# Patient Record
Sex: FEMALE | Race: WHITE | NOT HISPANIC OR LATINO | Employment: FULL TIME | URBAN - METROPOLITAN AREA
[De-identification: names, ages, dates, MRNs, and addresses within clinical notes are randomized per-mention and may not be internally consistent; named-entity substitution may affect disease eponyms.]

---
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
## 2024-01-04 PROCEDURE — 87081 CULTURE SCREEN ONLY: CPT | Performed by: FAMILY MEDICINE

## 2024-01-06 ENCOUNTER — TELEPHONE (OUTPATIENT)
Dept: URGENT CARE | Facility: CLINIC | Age: 19
End: 2024-01-06
Payer: COMMERCIAL

## 2024-02-15 ENCOUNTER — OFFICE VISIT (OUTPATIENT)
Dept: OBSTETRICS AND GYNECOLOGY | Facility: CLINIC | Age: 19
End: 2024-02-15
Payer: COMMERCIAL

## 2024-02-15 VITALS
HEART RATE: 91 BPM | WEIGHT: 234 LBS | DIASTOLIC BLOOD PRESSURE: 83 MMHG | SYSTOLIC BLOOD PRESSURE: 128 MMHG | BODY MASS INDEX: 40.17 KG/M2

## 2024-02-15 DIAGNOSIS — Z01.419 WELL WOMAN EXAM: Primary | ICD-10-CM

## 2024-02-15 DIAGNOSIS — Z11.3 SCREEN FOR STD (SEXUALLY TRANSMITTED DISEASE): ICD-10-CM

## 2024-02-15 LAB
B-HCG UR QL: NEGATIVE
C TRACH RRNA CVX QL NAA+PROBE: NOT DETECTED
EXPIRATION DATE: NORMAL
INTERNAL NEGATIVE CONTROL: NORMAL
INTERNAL POSITIVE CONTROL: NORMAL
Lab: NORMAL
N GONORRHOEA RRNA SPEC QL NAA+PROBE: NOT DETECTED

## 2024-02-15 PROCEDURE — G0123 SCREEN CERV/VAG THIN LAYER: HCPCS

## 2024-02-15 PROCEDURE — 87591 N.GONORRHOEAE DNA AMP PROB: CPT | Performed by: NURSE PRACTITIONER

## 2024-02-15 PROCEDURE — 87491 CHLMYD TRACH DNA AMP PROBE: CPT | Performed by: NURSE PRACTITIONER

## 2024-02-15 RX ORDER — VITAMIN A ACETATE, .BETA.-CAROTENE, ASCORBIC ACID, CHOLECALCIFEROL, .ALPHA.-TOCOPHEROL ACETATE, DL-, THIAMINE MONONITRATE, RIBOFLAVIN, NIACINAMIDE, PYRIDOXINE HYDROCHLORIDE, FOLIC ACID, CYANOCOBALAMIN, CALCIUM CARBONATE, FERROUS FUMARATE, ZINC OXIDE, AND CUPRIC OXIDE 2000; 2000; 120; 400; 22; 1.84; 3; 20; 10; 1; 12; 200; 27; 25; 2 [IU]/1; [IU]/1; MG/1; [IU]/1; MG/1; MG/1; MG/1; MG/1; MG/1; MG/1; UG/1; MG/1; MG/1; MG/1; MG/1
1 TABLET ORAL DAILY
Qty: 90 TABLET | Refills: 3 | Status: SHIPPED | OUTPATIENT
Start: 2024-02-15

## 2024-02-19 LAB
CONV .: NORMAL
CYTOLOGIST CVX/VAG CYTO: NORMAL
CYTOLOGY CVX/VAG DOC CYTO: NORMAL
CYTOLOGY CVX/VAG DOC THIN PREP: NORMAL
DX ICD CODE: NORMAL
HIV 1 & 2 AB SER-IMP: NORMAL
OTHER STN SPEC: NORMAL
STAT OF ADQ CVX/VAG CYTO-IMP: NORMAL

## 2024-08-23 ENCOUNTER — OFFICE VISIT (OUTPATIENT)
Dept: FAMILY MEDICINE CLINIC | Facility: CLINIC | Age: 19
End: 2024-08-23
Payer: COMMERCIAL

## 2024-08-23 VITALS
HEIGHT: 64 IN | OXYGEN SATURATION: 99 % | WEIGHT: 246.1 LBS | HEART RATE: 71 BPM | DIASTOLIC BLOOD PRESSURE: 82 MMHG | TEMPERATURE: 98.6 F | BODY MASS INDEX: 42.02 KG/M2 | SYSTOLIC BLOOD PRESSURE: 112 MMHG

## 2024-08-23 DIAGNOSIS — Z00.00 ANNUAL PHYSICAL EXAM: ICD-10-CM

## 2024-08-23 DIAGNOSIS — K64.9 HEMORRHOIDS, UNSPECIFIED HEMORRHOID TYPE: ICD-10-CM

## 2024-08-23 DIAGNOSIS — F41.1 GAD (GENERALIZED ANXIETY DISORDER): ICD-10-CM

## 2024-08-23 DIAGNOSIS — Z00.00 ENCOUNTER FOR MEDICAL EXAMINATION TO ESTABLISH CARE: Primary | ICD-10-CM

## 2024-08-23 LAB
ALBUMIN SERPL-MCNC: 4 G/DL (ref 3.5–5.2)
ALBUMIN/GLOB SERPL: 1.2 G/DL
ALP SERPL-CCNC: 73 U/L (ref 39–117)
ALT SERPL W P-5'-P-CCNC: 27 U/L (ref 1–33)
ANION GAP SERPL CALCULATED.3IONS-SCNC: 7.9 MMOL/L (ref 5–15)
AST SERPL-CCNC: 17 U/L (ref 1–32)
BASOPHILS # BLD AUTO: 0.04 10*3/MM3 (ref 0–0.2)
BASOPHILS NFR BLD AUTO: 0.5 % (ref 0–1.5)
BILIRUB SERPL-MCNC: 0.2 MG/DL (ref 0–1.2)
BUN SERPL-MCNC: 7 MG/DL (ref 6–20)
BUN/CREAT SERPL: 7 (ref 7–25)
CALCIUM SPEC-SCNC: 9.2 MG/DL (ref 8.6–10.5)
CHLORIDE SERPL-SCNC: 103 MMOL/L (ref 98–107)
CHOLEST SERPL-MCNC: 71 MG/DL (ref 0–200)
CO2 SERPL-SCNC: 26.1 MMOL/L (ref 22–29)
CREAT SERPL-MCNC: 1 MG/DL (ref 0.57–1)
DEPRECATED RDW RBC AUTO: 39.2 FL (ref 37–54)
EGFRCR SERPLBLD CKD-EPI 2021: 82.4 ML/MIN/1.73
EOSINOPHIL # BLD AUTO: 0.25 10*3/MM3 (ref 0–0.4)
EOSINOPHIL NFR BLD AUTO: 3.1 % (ref 0.3–6.2)
ERYTHROCYTE [DISTWIDTH] IN BLOOD BY AUTOMATED COUNT: 13.4 % (ref 12.3–15.4)
GLOBULIN UR ELPH-MCNC: 3.4 GM/DL
GLUCOSE SERPL-MCNC: 75 MG/DL (ref 65–99)
HCT VFR BLD AUTO: 36.7 % (ref 34–46.6)
HDLC SERPL-MCNC: 42 MG/DL (ref 40–60)
HGB BLD-MCNC: 12.2 G/DL (ref 12–15.9)
IMM GRANULOCYTES # BLD AUTO: 0.01 10*3/MM3 (ref 0–0.05)
IMM GRANULOCYTES NFR BLD AUTO: 0.1 % (ref 0–0.5)
LDLC SERPL CALC-MCNC: 16 MG/DL (ref 0–100)
LDLC/HDLC SERPL: 0.44 {RATIO}
LYMPHOCYTES # BLD AUTO: 3.05 10*3/MM3 (ref 0.7–3.1)
LYMPHOCYTES NFR BLD AUTO: 38.4 % (ref 19.6–45.3)
MCH RBC QN AUTO: 27.4 PG (ref 26.6–33)
MCHC RBC AUTO-ENTMCNC: 33.2 G/DL (ref 31.5–35.7)
MCV RBC AUTO: 82.3 FL (ref 79–97)
MONOCYTES # BLD AUTO: 0.64 10*3/MM3 (ref 0.1–0.9)
MONOCYTES NFR BLD AUTO: 8.1 % (ref 5–12)
NEUTROPHILS NFR BLD AUTO: 3.96 10*3/MM3 (ref 1.7–7)
NEUTROPHILS NFR BLD AUTO: 49.8 % (ref 42.7–76)
NRBC BLD AUTO-RTO: 0 /100 WBC (ref 0–0.2)
PLATELET # BLD AUTO: 329 10*3/MM3 (ref 140–450)
PMV BLD AUTO: 11.4 FL (ref 6–12)
POTASSIUM SERPL-SCNC: 4.1 MMOL/L (ref 3.5–5.2)
PROT SERPL-MCNC: 7.4 G/DL (ref 6–8.5)
RBC # BLD AUTO: 4.46 10*6/MM3 (ref 3.77–5.28)
SODIUM SERPL-SCNC: 137 MMOL/L (ref 136–145)
TRIGL SERPL-MCNC: 52 MG/DL (ref 0–150)
TSH SERPL DL<=0.05 MIU/L-ACNC: 1.44 UIU/ML (ref 0.27–4.2)
VLDLC SERPL-MCNC: 13 MG/DL (ref 5–40)
WBC NRBC COR # BLD AUTO: 7.95 10*3/MM3 (ref 3.4–10.8)

## 2024-08-23 PROCEDURE — 80050 GENERAL HEALTH PANEL: CPT

## 2024-08-23 PROCEDURE — 80061 LIPID PANEL: CPT

## 2024-08-23 RX ORDER — HYDROCORTISONE ACETATE 25 MG/1
25 SUPPOSITORY RECTAL 2 TIMES DAILY PRN
Qty: 12 EACH | Refills: 1 | Status: SHIPPED | OUTPATIENT
Start: 2024-08-23

## 2024-08-23 RX ORDER — ESCITALOPRAM OXALATE 10 MG/1
10 TABLET ORAL DAILY
Qty: 30 TABLET | Refills: 2 | Status: SHIPPED | OUTPATIENT
Start: 2024-08-23

## 2024-10-18 PROCEDURE — 87086 URINE CULTURE/COLONY COUNT: CPT

## 2024-10-18 PROCEDURE — 87077 CULTURE AEROBIC IDENTIFY: CPT

## 2024-10-18 PROCEDURE — 87186 SC STD MICRODIL/AGAR DIL: CPT

## 2024-11-15 ENCOUNTER — TELEPHONE (OUTPATIENT)
Dept: OBSTETRICS AND GYNECOLOGY | Facility: CLINIC | Age: 19
End: 2024-11-15
Payer: COMMERCIAL

## 2024-11-15 NOTE — TELEPHONE ENCOUNTER
11/15/2024 LVM FOR PATIENT TO RETURN CALL TO RESCHEDULE ANNUAL APPT ON  2/17/2025  WITH KIRSTIN BAZAN SINCE SHE'LL BE OUT OF THE OFFICE ON THIS DAY.   11/18/2024 PATIENT WAS CALLED AND SCHEDULED FOR 2/18/2025 @ 2:45 FOR ANNUAL WITH KIRSTIN BAZAN.  PATIENT TO CHECK HER PLANNER AND IF TIME DOESN'T WORK SHE'LL CALL BACK TO RESCHEDULE.

## 2025-02-18 ENCOUNTER — PATIENT MESSAGE (OUTPATIENT)
Dept: OBSTETRICS AND GYNECOLOGY | Facility: CLINIC | Age: 20
End: 2025-02-18
Payer: COMMERCIAL

## 2025-02-20 NOTE — PROGRESS NOTES
"Chief Complaint   Patient presents with    Annual Exam     HPI:   20 y.o. . Presents for well woman exam. Contraception:  Natural family planning  Menses:   q month, lasts 5 days, changes super tampon q 2 hrs on heaviest days.   Pain:  None  Last pap: normal IGP,rfx Aptima HPV All Pth (02/15/2024 15:51)    Desires pregnancy, not using contraception with partner for over 1 year. Hx chlamydia.     Past Medical History:   Diagnosis Date    Abnormal uterine bleeding (AUB)     Anxiety     Chlamydia     H/O cold sores     Hemorrhoids     Migraines     Obesity     PCOS (polycystic ovarian syndrome)       Past Surgical History:   Procedure Laterality Date    DENTAL PROCEDURE      WRIST SURGERY Left       Family History   Problem Relation Age of Onset    Anxiety disorder Mother     Diabetes Maternal Grandmother         My great grandma    Heart disease Other     Hyperlipidemia Other     Hypertension Other     Diabetes Other     Lung cancer Other     Uterine cancer Neg Hx     Prostate cancer Neg Hx      Allergies as of 2025    (No Known Allergies)        PCP: does manage PMHx and preventative labs    /84   Pulse 89   Ht 162.6 cm (64\")   Wt 114 kg (252 lb)   LMP 2025 (Exact Date)   BMI 43.26 kg/m²     PHYSICAL EXAM: Chaperone present   General- NAD, alert and oriented, appropriate  Psych- Normal mood, good memory  Neck- No masses, no thyroid enlargement  Lymphatic- No palpable neck, axillary, or groin nodes  CV- Regular rhythm, no murmurs  Resp- CTA to bases, no wheezes  Abdomen- Soft, non distended, non tender, no masses  Breast left-  Bilaterally symmetrical, no masses, non tender, no nipple discharge  Breast right- Bilaterally symmetrical, no masses, non tender, no nipple discharge  External genitalia- Normal female, no lesions  Urethra/meatus- Normal, no masses, non tender, no prolapse  Bladder- Normal, no masses, non tender, no prolapse  Vagina- Normal, no atrophy, no lesions, no discharge, " no prolapse  Cvx- Normal, no lesions, no discharge, No cervical motion tenderness  Uterus- Normal size, shape & consistency.  Non tender, mobile.  Adnexa- No mass, non tender  Anus/Rectum/Perineum- Not performed  Ext- No edema, no cyanosis    Skin- No lesions, no rashes, no acanthosis nigricans    ASSESSMENT and PLAN:    Diagnoses and all orders for this visit:    1. Well woman exam (Primary)    2. Screen for STD (sexually transmitted disease)  -     Chlamydia trachomatis, Neisseria gonorrhoeae, PCR - Swab, Cervix  -     RPR, Rfx Qn RPR / Confirm TP  -     Hepatitis B Surface Antigen  -     Hepatitis C Antibody  -     HIV-1 / O / 2 Ag / Antibody 4th Generation        Preventative:   BREAST HEALTH- Monthly self breast exam importance and how to reviewed. MMG and/or MRI (prn) reviewed per society guidelines and her individual history. Screening Imaging: Not medically needed  CERVICAL CANCER Screening- Reviewed current ASCCP guidelines for screening w and wo cotest HPV, age specific.  Screen: Already up to date  SEXUAL HEALTH: STD screening desired.  Ordered,  Safe sex and condoms  VACCINATIONS Recommended: COVID and booster PRN, Flu annually  Importance discussed, risk being unvaccinated reviewed.  Questions answered  Genetic testing: Does not qualify.  Smoking status- NON SMOKER  Follow up PCP/Specialist PMHx and Labs  TRACK MENSES, RTO if <q21d, >7d long, heavy or painful.    All BIRTH CONTROL options R/B/A/SE/E of each reviewed in detail.  SAFE SEX/condoms importance reviewed.    PNV daily and healthy lifestyle if desires pregnancy, schedule with MD discuss fertility management, HSG per pt request. Counseled regarding use of ovulation predictor, timed intercourse.   Follow Up:  Return in about 1 year (around 3/3/2026), or if symptoms worsen or fail to improve, for with md discuss fertility management, hsg.        Sendy Alex, APRN  03/03/2025

## 2025-03-03 ENCOUNTER — OFFICE VISIT (OUTPATIENT)
Dept: OBSTETRICS AND GYNECOLOGY | Facility: CLINIC | Age: 20
End: 2025-03-03
Payer: COMMERCIAL

## 2025-03-03 VITALS
DIASTOLIC BLOOD PRESSURE: 84 MMHG | HEART RATE: 89 BPM | BODY MASS INDEX: 43.02 KG/M2 | HEIGHT: 64 IN | SYSTOLIC BLOOD PRESSURE: 131 MMHG | WEIGHT: 252 LBS

## 2025-03-03 DIAGNOSIS — Z01.419 WELL WOMAN EXAM: Primary | ICD-10-CM

## 2025-03-03 DIAGNOSIS — Z11.3 SCREEN FOR STD (SEXUALLY TRANSMITTED DISEASE): ICD-10-CM

## 2025-03-03 LAB
C TRACH RRNA CVX QL NAA+PROBE: NOT DETECTED
N GONORRHOEA RRNA SPEC QL NAA+PROBE: NOT DETECTED

## 2025-03-03 PROCEDURE — 87491 CHLMYD TRACH DNA AMP PROBE: CPT | Performed by: NURSE PRACTITIONER

## 2025-03-03 PROCEDURE — 87591 N.GONORRHOEAE DNA AMP PROB: CPT | Performed by: NURSE PRACTITIONER

## 2025-04-01 ENCOUNTER — PATIENT MESSAGE (OUTPATIENT)
Dept: OBSTETRICS AND GYNECOLOGY | Age: 20
End: 2025-04-01
Payer: COMMERCIAL

## 2025-08-28 PROCEDURE — 80050 GENERAL HEALTH PANEL: CPT | Performed by: FAMILY MEDICINE
